# Patient Record
Sex: MALE | Race: BLACK OR AFRICAN AMERICAN | NOT HISPANIC OR LATINO | URBAN - METROPOLITAN AREA
[De-identification: names, ages, dates, MRNs, and addresses within clinical notes are randomized per-mention and may not be internally consistent; named-entity substitution may affect disease eponyms.]

---

## 2023-06-09 ENCOUNTER — EMERGENCY (EMERGENCY)
Facility: HOSPITAL | Age: 78
LOS: 1 days | Discharge: HOME | End: 2023-06-09
Admitting: EMERGENCY MEDICINE
Payer: MEDICARE

## 2023-06-09 VITALS
DIASTOLIC BLOOD PRESSURE: 84 MMHG | SYSTOLIC BLOOD PRESSURE: 146 MMHG | RESPIRATION RATE: 17 BRPM | HEART RATE: 94 BPM | TEMPERATURE: 98 F | OXYGEN SATURATION: 96 % | WEIGHT: 149.91 LBS

## 2023-06-09 DIAGNOSIS — R22.42 LOCALIZED SWELLING, MASS AND LUMP, LEFT LOWER LIMB: ICD-10-CM

## 2023-06-09 DIAGNOSIS — M79.672 PAIN IN LEFT FOOT: ICD-10-CM

## 2023-06-09 PROCEDURE — 73630 X-RAY EXAM OF FOOT: CPT | Mod: 26,LT

## 2023-06-09 PROCEDURE — 99284 EMERGENCY DEPT VISIT MOD MDM: CPT | Mod: 25

## 2023-06-09 PROCEDURE — 99284 EMERGENCY DEPT VISIT MOD MDM: CPT

## 2023-06-09 PROCEDURE — 73630 X-RAY EXAM OF FOOT: CPT

## 2023-06-09 PROCEDURE — 73610 X-RAY EXAM OF ANKLE: CPT | Mod: 26,LT

## 2023-06-09 PROCEDURE — 73610 X-RAY EXAM OF ANKLE: CPT

## 2023-06-09 RX ORDER — IBUPROFEN 200 MG
600 TABLET ORAL ONCE
Refills: 0 | Status: COMPLETED | OUTPATIENT
Start: 2023-06-09 | End: 2023-06-09

## 2023-06-09 RX ADMIN — Medication 600 MILLIGRAM(S): at 21:02

## 2023-06-09 NOTE — ED PROVIDER NOTE - CLINICAL SUMMARY MEDICAL DECISION MAKING FREE TEXT BOX
77 M cannot recall pmh but states does not take any meds (family friend states no pmh), p/w intermittent L foot pain/swelling x 2 yrs.  sees outpt podiatrist.  on exam comfortable appearing, vss, afebrile, Ext: thick long toenails b/l, no open wounds, no clubbing or cyanosis, no erythema or warmth, FROM throughout, no peripheral edema or calf ttp, mild ttp over L 5th MTP and midfoot w/o any deformity, DP/PT pulses 2+ b/l, distal SILT equal throughout. ?pvd but NVI do not think acute vascular pathology, no e/o infection.  will give analgesia and obtain xrays as reported h/o prior surgeries w/ chronic pain    xrays show no acute fx or dislocation.  educated on RICE, toenail hygiene and f/u outpt w/ podiatry.  discussed strict return parameters

## 2023-06-09 NOTE — ED PROVIDER NOTE - PATIENT PORTAL LINK FT
You can access the FollowMyHealth Patient Portal offered by [facility] by registering at the following website: [email or web address]. By joining [employer]’s FollowMyHealth portal, you will also be able to view your health information using other applications (apps) compatible with our system.

## 2023-06-09 NOTE — ED ADULT NURSE NOTE - OBJECTIVE STATEMENT
Pt presents to ED c/o left ankle pain/swelling x1mo  Pt stated "is a lot of pain and can't walk" Obvious swelling noted on left. No PMH noted

## 2023-06-09 NOTE — ED ADULT TRIAGE NOTE - CHIEF COMPLAINT QUOTE
pt c/o left ankle pain and swelling x 2 years after foot surgery. pt stated " he is a lot of pain and can't walk"

## 2023-06-09 NOTE — ED ADULT NURSE NOTE - NSFALLUNIVINTERV_ED_ALL_ED
Bed/Stretcher in lowest position, wheels locked, appropriate side rails in place/Call bell, personal items and telephone in reach/Instruct patient to call for assistance before getting out of bed/chair/stretcher/Non-slip footwear applied when patient is off stretcher/[place] to call system/Physically safe environment - no spills, clutter or unnecessary equipment/Purposeful proactive rounding/Room/bathroom lighting operational, light cord in reach

## 2023-06-09 NOTE — ED PROVIDER NOTE - NSFOLLOWUPINSTRUCTIONS_ED_ALL_ED_FT
Take tylenol 650mg or motrin 400-800mg as needed every 4-6 hours for pain.   REST- Rest your hurting/injured joint or extremity to decrease pain and swelling for 24-48 hours    ICE- Apply ice to area of pain to decreased inflammation and pain, put towel/barrier between ice and skin. You can keep ice on for 20 minutes at a time 4-8 times daily   COMPRESSION- Wear ace wrap or brace for support to reduce swelling.  Make sure not to wrap too tight, loosen if skin feeling numb/tingling or skin turns blue   ELEVATION- Elevate hurting/injured area 6 or more inches about level of heart to decrease swelling/inflammation.  Use pillow under joint to elevate area    You can follow up in podiatry clinic on [address] Call to arrange appointment [phone]  You should have your toe nails cut shorter to prevent pain or infection

## 2023-06-09 NOTE — ED PROVIDER NOTE - CARE PROVIDER_API CALL
Zachary Granados  Podiatric Medicine and Surgery  [address], [address]  [place], [place] [address]  Phone: [phone]  Fax: [phone]  Follow Up Time:

## 2023-06-09 NOTE — ED PROVIDER NOTE - OBJECTIVE STATEMENT
77 M cannot recall pmh but states does not take any meds (family friend states no pmh), p/w intermittent L foot pain/swelling x 2 yrs.  states he had ?unknown surgery on L foot few yrs ago and has had some pain to area since.  has been seeing podiatrist but pain persists.  not taking anything at home for pain.  denies f/c, skin changes, numbness/weakness, paresthesias, calf pain/swelling, chest pain, sob, limited ROM ext, fall/trauma.  pt ambulates with cane.  no h/o DM

## 2023-06-09 NOTE — ED PROVIDER NOTE - PHYSICAL EXAMINATION
Vitals reviewed  Gen: comfortable appearing elderly M, nad, speaking in full sentences  Skin: wwp, no rash/lesions  HEENT: ncat, eomi, mmm  CV: +s1/2, no audible m/r/g  Resp: symmetrical expansion, ctab, no w/r/r  Ext: thick long toenails b/l, no open wounds, no clubbing or cyanosis, no erythema or warmth, FROM throughout, no peripheral edema or calf ttp, mild ttp over L 5th MTP and midfoot w/o any deformity, DP/PT pulses 2+ b/l, distal SILT equal throughout  Neuro: alert/oriented, no focal deficits, steady gait w/ cane

## 2023-12-27 NOTE — ED PROVIDER NOTE - IV ALTEPLASE INCLUSION HIDDEN
45M, preDM, ?HTN (no meds), who is sent from cardiology clinic for possible admission. For the past several months, reports L sided chest pain. Non-radiating. Non-smoker. Denies illicits. Denies hx of VTE. No leg swelling. Pain is becoming more frequent and thus he was sent from clinic ([facility]) for possible admission. No belly pain, nvd, dysuria, hematuria, recent travel, trauma, syncope. Afebrile, non-toxic appearing. EKG with TWI III/aVF. Will pursue ACS evaluation. Cardiac monitoring. Dispo pending.
show